# Patient Record
Sex: MALE | Race: WHITE | HISPANIC OR LATINO | URBAN - METROPOLITAN AREA
[De-identification: names, ages, dates, MRNs, and addresses within clinical notes are randomized per-mention and may not be internally consistent; named-entity substitution may affect disease eponyms.]

---

## 2021-10-21 ENCOUNTER — EMERGENCY (EMERGENCY)
Facility: HOSPITAL | Age: 26
LOS: 1 days | Discharge: HOME | End: 2021-10-21
Attending: EMERGENCY MEDICINE
Payer: COMMERCIAL

## 2021-10-21 VITALS
SYSTOLIC BLOOD PRESSURE: 142 MMHG | DIASTOLIC BLOOD PRESSURE: 101 MMHG | OXYGEN SATURATION: 96 % | HEIGHT: 74 IN | HEART RATE: 87 BPM | TEMPERATURE: 97 F | RESPIRATION RATE: 18 BRPM

## 2021-10-21 PROCEDURE — 99283 EMERGENCY DEPT VISIT LOW MDM: CPT

## 2021-10-21 RX ORDER — CIPROFLOXACIN HCL 0.3 %
1 DROPS OPHTHALMIC (EYE) ONCE
Refills: 0 | Status: COMPLETED | OUTPATIENT
Start: 2021-10-21 | End: 2021-10-21

## 2021-10-21 RX ADMIN — Medication 1 DROP(S): at 21:47

## 2021-10-21 NOTE — ED PROVIDER NOTE - PATIENT PORTAL LINK FT
You can access the FollowMyHealth Patient Portal offered by [facility] by registering at the following website: [email or web address]. By joining [employer]’s FollowMyHealth portal, you will also be able to view your health information using other applications (apps) compatible with our system.

## 2021-10-21 NOTE — ED PROVIDER NOTE - OBJECTIVE STATEMENT
26 y/o male with no sign medical history contact eye wearer presents to the ED c/o blurry to the right eye, and redness since this morning. 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 Cannot recall a specific even where he got something in the eye. Went to urgent care who advised him that he has a corneal abrasion but did not have the equipment to do a full exam. Currently wearing his glasses. No discahrge from the eye, swelling of the eyelid, eye pain.

## 2021-10-21 NOTE — ED PROVIDER NOTE - ATTENDING CONTRIBUTION TO CARE
AJM: pt with b/l eye redness and irritation worse in right eye. +abrasions of flouroscein stain. No trauma. no fevers. +visual changes in right eye. No pain. PERRLA and EOMI. IOP 7 in right eye. wears contacts. will dc with abx, glasses, ophtho follow up in AM. Pt instructed on the importance of prompt optho follow up

## 2021-10-21 NOTE — ED PROVIDER NOTE - CLINICAL SUMMARY MEDICAL DECISION MAKING FREE TEXT BOX
corneal abrasion of right eye, no foreign body, injected eye, will give abx, no contact lens, /fu with ophtho tomorrow

## 2021-10-21 NOTE — ED PROVIDER NOTE - CHPI ED SYMPTOMS NEG
no discharge/no drainage/no itching/no photophobia/no purulent drainage/no double vision/no eye lid swelling/no foreign body

## 2021-10-21 NOTE — ED ADULT TRIAGE NOTE - CHIEF COMPLAINT QUOTE
Ambulated into ED with steady gait c/o object in right eye. 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 States that he does wear contacts but that he removed his contacts and the irritation did not stop. Presented to urgent care today and was told he has a corneal abrasion and they were not equipped to examine him so they sent him here. Denies any trauma to the area.

## 2021-10-21 NOTE — ED PROVIDER NOTE - CARE PROVIDER_API CALL
Batsheva Hager)  Ophthalmology  [address], [address]  [place] [address]  Phone: [phone]  Fax: [phone]  Follow Up Time: